# Patient Record
Sex: MALE | Race: WHITE
[De-identification: names, ages, dates, MRNs, and addresses within clinical notes are randomized per-mention and may not be internally consistent; named-entity substitution may affect disease eponyms.]

---

## 2018-10-03 ENCOUNTER — HOSPITAL ENCOUNTER (EMERGENCY)
Dept: HOSPITAL 31 - C.ER | Age: 73
Discharge: HOME | End: 2018-10-03
Payer: MEDICARE

## 2018-10-03 VITALS — OXYGEN SATURATION: 98 % | TEMPERATURE: 97.5 F

## 2018-10-03 VITALS — HEART RATE: 77 BPM | SYSTOLIC BLOOD PRESSURE: 125 MMHG | RESPIRATION RATE: 20 BRPM | DIASTOLIC BLOOD PRESSURE: 75 MMHG

## 2018-10-03 VITALS — BODY MASS INDEX: 26.1 KG/M2

## 2018-10-03 DIAGNOSIS — K64.9: Primary | ICD-10-CM

## 2018-10-03 LAB
ALBUMIN SERPL-MCNC: 3.6 G/DL (ref 3.5–5)
ALBUMIN/GLOB SERPL: 1.2 {RATIO} (ref 1–2.1)
ALT SERPL-CCNC: 40 U/L (ref 21–72)
AST SERPL-CCNC: 25 U/L (ref 17–59)
BASOPHILS # BLD AUTO: 0.1 K/UL (ref 0–0.2)
BASOPHILS NFR BLD: 1.3 % (ref 0–2)
BILIRUB UR-MCNC: NEGATIVE MG/DL
BUN SERPL-MCNC: 16 MG/DL (ref 9–20)
CALCIUM SERPL-MCNC: 9.2 MG/DL (ref 8.6–10.4)
EOSINOPHIL # BLD AUTO: 0.2 K/UL (ref 0–0.7)
EOSINOPHIL NFR BLD: 3.6 % (ref 0–4)
ERYTHROCYTE [DISTWIDTH] IN BLOOD BY AUTOMATED COUNT: 15 % (ref 11.5–14.5)
GFR NON-AFRICAN AMERICAN: > 60
GLUCOSE UR STRIP-MCNC: NORMAL MG/DL
HGB BLD-MCNC: 13.8 G/DL (ref 12–18)
LEUKOCYTE ESTERASE UR-ACNC: (no result) LEU/UL
LIPASE: 42 U/L (ref 23–300)
LYMPHOCYTES # BLD AUTO: 1.8 K/UL (ref 1–4.3)
LYMPHOCYTES NFR BLD AUTO: 43 % (ref 20–40)
MCH RBC QN AUTO: 29.5 PG (ref 27–31)
MCHC RBC AUTO-ENTMCNC: 33.4 G/DL (ref 33–37)
MCV RBC AUTO: 88.2 FL (ref 80–94)
MONOCYTES # BLD: 0.6 K/UL (ref 0–0.8)
MONOCYTES NFR BLD: 13.5 % (ref 0–10)
NEUTROPHILS # BLD: 1.6 K/UL (ref 1.8–7)
NEUTROPHILS NFR BLD AUTO: 38.6 % (ref 50–75)
NRBC BLD AUTO-RTO: 0.1 % (ref 0–2)
PH UR STRIP: 5 [PH] (ref 5–8)
PLATELET # BLD: 228 K/UL (ref 130–400)
PMV BLD AUTO: 8.7 FL (ref 7.2–11.7)
PROT UR STRIP-MCNC: NEGATIVE MG/DL
RBC # BLD AUTO: 4.69 MIL/UL (ref 4.4–5.9)
RBC # UR STRIP: NEGATIVE /UL
SP GR UR STRIP: 1.02 (ref 1–1.03)
SQUAMOUS EPITHIAL: 1 /HPF (ref 0–5)
UROBILINOGEN UR-MCNC: NORMAL MG/DL (ref 0.2–1)
WBC # BLD AUTO: 4.2 K/UL (ref 4.8–10.8)

## 2018-10-03 NOTE — CT
Date of service: 



10/03/2018



PROCEDURE:  CT Abdomen and Pelvis with contrast



HISTORY:

rectal pain



COMPARISON:

Not available



TECHNIQUE:

Contrast dose: 100 mL Visipaque 320



Radiation dose:



Total exam DLP = 366.20 mGy-cm.



This CT exam was performed using one or more of the following dose 

reduction techniques: Automated exposure control, adjustment of the 

mA and/or kV according to patient size, and/or use of iterative 

reconstruction technique.



FINDINGS:



LOWER THORAX:

Unremarkable. 



LIVER:

Unremarkable. No gross lesion or ductal dilatation. 



GALLBLADDER AND BILE DUCTS:

Unremarkable. 



PANCREAS:

Unremarkable. No gross lesion or ductal dilatation.



SPLEEN:

Unremarkable. 



ADRENALS:

Unremarkable. No mass. 



KIDNEYS AND URETERS:

Unremarkable. No hydronephrosis. No solid mass. 



VASCULATURE:

Unremarkable. No aortic aneurysm. 



BOWEL:

Diverticulosis of the transverse colon and distal descending/sigmoid 

colon.  No evidence of diverticulitis.  Mild mural thickening of the 

inferior rectum with relatively low attenuation within the wall 

suggestive of prior episodes of inflammatory bowel disease.  Please 

correlate historically.  No evidence of neoplasm.  



APPENDIX:

Normal appendix. 



PERITONEUM:

Unremarkable. No free fluid. No free air. 



LYMPH NODES:

No frankly enlarged nodes.  Shotty subcentimeter nodes are seen in 

the small bowel mesentery, retroperitoneum and medial to the 

cecum/ascending colon.  Findings consistent with nonspecific 

mesenteric adenitis. 



BLADDER:

Unremarkable. 



REPRODUCTIVE:

Normal prostate 



BONES:

No acute fracture. 



OTHER FINDINGS:

None.



IMPRESSION:

Findings consistent with nonspecific mesenteric adenitis.  Mild 

thickening of the inferior rectum with relative low attenuation 

centrally within the wall suggests possible prior episode of 

inflammatory bowel disease.  Correlate clinically.  No other 

significant abnormality.

## 2018-10-03 NOTE — C.PDOC
History Of Present Illness


72 year old male presents to the ED with complaints of rectal pain, ongoing for 

over 10 days. He also notes severe pain with bowel movements, which prompted 

todays visit. Patient also states when he wipes, he sees blood. Otherwise p

atient denies any fever, chills, abdominal pain, vomiting, diarrhea, or other 

associated symptoms. 








<Rachel Jaeger - Last Filed: 10/03/18 17:11>


History Per: Patient


History/Exam Limitations: no limitations


Onset/Duration Of Symptoms: Days (> 10)


Current Symptoms Are (Timing): Still Present


Location Of Pain/Discomfort: Other (rectal)


Radiation Of Pain To:: None


Exacerbating Factors: Other (BMs)


Recent travel outside of the [place]: No





<Rachel Jaeger - Last Filed: 10/03/18 17:11>





<Ivana Bettencourt - Last Filed: 10/06/18 05:25>


Time Seen by Provider: 10/03/18 11:01


Chief Complaint (Nursing): GI Problem





Past Medical History


Reviewed: Historical Data, Nursing Documentation, Vital Signs


Vital Signs: 





                                Last Vital Signs











Temp  97.5 F L  10/03/18 10:31


 


Pulse  76   10/03/18 10:31


 


Resp  18   10/03/18 10:31


 


BP  131/77   10/03/18 10:31


 


Pulse Ox  98   10/03/18 10:31














- Medical History


PMH: GERD, HTN, Hyperlipidemia


Other Surgeries: Left eye surgery


Family History: States: No Known Family Hx





- Social History


Hx Alcohol Use: No


Hx Substance Use: No





- Immunization History


Hx Influenza Vaccination: Yes (9/2018)





<Rachel Jaeger - Last Filed: 10/03/18 17:11>


Vital Signs: 





                                Last Vital Signs











Temp  97.5 F L  10/03/18 10:31


 


Pulse  77   10/03/18 13:01


 


Resp  20   10/03/18 13:01


 


BP  125/75   10/03/18 13:01


 


Pulse Ox  98   10/03/18 17:13














<Ivana Bettencourt - Last Filed: 10/06/18 05:25>





Review Of Systems


Except As Marked, All Systems Reviewed And Found Negative.


Constitutional: Negative for: Fever, Chills


Gastrointestinal: Positive for: Rectal Pain, Other (Pain on bowel movement, 

+blood when wiping).  Negative for: Vomiting, Abdominal Pain, Diarrhea


Genitourinary: Negative for: Dysuria, Frequency, Hematuria





<Rachel Jaeger - Last Filed: 10/03/18 17:11>





Physical Exam





- Physical Exam


Appears: Non-toxic, No Acute Distress


Skin: Normal Color, Warm, Dry


Head: Atraumatic, Normacephalic


Eye(s): bilateral: Normal Inspection


Neck: Normal ROM


Chest: Symmetrical


Cardiovascular: Rhythm Regular, No Murmur


Respiratory: Normal Breath Sounds, No Accessory Muscle Use


Gastrointestinal/Abdominal: Soft, No Tenderness, No Guarding, No Rebound


Rectal: No Hemorrhoids (none externally), Tenderness (around rectal area)


Extremity: Normal ROM


Extremity: Bilateral: Atraumatic, Normal Color And Temperature, Normal ROM


Neurological/Psych: Oriented x3, Normal Speech





<Rachel Jaeger - Last Filed: 10/03/18 17:11>





ED Course And Treatment





- Laboratory Results


Result Diagrams: 


                                 10/03/18 11:44





                                 10/03/18 11:44


Lab Interpretation: Normal


O2 Sat by Pulse Oximetry: 98 (RA)


Pulse Ox Interpretation: Normal





- CT Scan/US


  ** No standard instances


Other Rad Studies (CT/US): Read By Radiologist, Radiology Report Reviewed


CT/US Interpretation: FINDINGS:  LOWER THORAX:  Unremarkable.  LIVER:  

Unremarkable. No gross lesion or ductal dilatation.  GALLBLADDER AND BILE DUCTS:

 Unremarkable.  PANCREAS:  Unremarkable. No gross lesion or ductal dilatation.  

SPLEEN:  Unremarkable.  ADRENALS:  Unremarkable. No mass.  KIDNEYS AND URETERS: 

Unremarkable. No hydronephrosis. No solid mass.  VASCULATURE:  Unremarkable. No 

aortic aneurysm.  BOWEL:  Diverticulosis of the transverse colon and distal 

descending/sigmoid colon.  No evidence of diverticulitis.  Mild mural thickening

of the inferior rectum with relatively low attenuation within the wall 

suggestive of prior episodes of inflammatory bowel disease.  Please correlate 

historically.  No evidence of neoplasm.  APPENDIX:  Normal appendix.  

PERITONEUM:  Unremarkable. No free fluid. No free air.  LYMPH NODES:  No frankly

enlarged nodes.  Shotty subcentimeter nodes are seen in the small bowel 

mesentery, retroperitoneum and medial to the cecum/ascending colon.  Findings 

consistent with nonspecific mesenteric adenitis.  BLADDER:  Unremarkable.  

REPRODUCTIVE:  Normal prostate.  BONES:  No acute fracture.  OTHER FINDINGS:  

None.  IMPRESSION:  Findings consistent with nonspecific mesenteric adenitis.  

Mild thickening of the inferior rectum with relative low attenuation centrally 

within the wall suggests possible prior episode of inflammatory bowel disease.  

Correlate clinically.  No other significant abnormality.


Progress Note: re-evaluation abdomen soft in no distress


Reassessment Condition: Improved





<Rachel Jaeger VIDAL - Last Filed: 10/03/18 17:11>





- Laboratory Results


Result Diagrams: 


                                 10/03/18 11:44





                                 10/03/18 11:44





<Ivana Bettencourt - Last Filed: 10/06/18 05:25>





Medical Decision Making


Medical Decision Making: 





Impression: 72 year old with rectal pain, r/o perirectal fissure or abscess





Plan:


--CMP


--Lipase


--CBC


--Urinalysis


--CT Abdomen/Pelvis with IV contrast


--Reassess and disposition











<Rachel Jaeger - Last Filed: 10/03/18 17:11>





Disposition


Counseled Patient/Family Regarding: Studies Performed, Diagnosis, Need For 

Followup, Rx Given





- Disposition


Disposition Time: 14:40





- POA


Present On Arrival: None





<Rachel Jaeger - Last Filed: 10/03/18 17:11>





<Ivana Bettencourt A - Last Filed: 10/06/18 05:25>





- Disposition


Referrals: 


Dominique Garcia MD [Staff Provider] - 


Disposition: HOME/ ROUTINE


Condition: STABLE


Additional Instructions: 


Follow up with your PMD for further evaluation


Prescriptions: 


Hard Fat/Phenylephrine Hydro [Anusol Suppository] 1 sup RC DAILY PRN #12 sup


 PRN Reason: Hemorrhoids


Instructions:  Hemorrhoids (DC)


Forms:  [facility] (English)





- Clinical Impression


Clinical Impression: 


 Hemorrhoids








- PA / NP / Resident Statement


MD/DO has reviewed & agrees with the documentation as recorded.





- Scribe Statement


The provider has reviewed the documentation as recorded by the Scribe (Alexia Tejada)





All medical record entries made by the Scribe were at my direction and 

personally dictated by me. I have reviewed the chart and agree that the record 

accurately reflects my personal performance of the history, physical exam, 

medical decision making, and the department course for this patient. I have also

personally directed, reviewed, and agree with the discharge instructions and 

disposition.





<Rachel Jaeger - Last Filed: 10/03/18 17:11>





- PA / NP / Resident Statement


MD/DO has reviewed & agrees with the documentation as recorded.





<Ivana Bettencourt - Last Filed: 10/06/18 05:25>